# Patient Record
Sex: MALE | URBAN - METROPOLITAN AREA
[De-identification: names, ages, dates, MRNs, and addresses within clinical notes are randomized per-mention and may not be internally consistent; named-entity substitution may affect disease eponyms.]

---

## 2024-03-11 ENCOUNTER — HOSPITAL ENCOUNTER (OUTPATIENT)
Facility: MEDICAL CENTER | Age: 59
End: 2024-03-11
Attending: PHYSICIAN ASSISTANT
Payer: COMMERCIAL

## 2024-03-11 PROCEDURE — 87186 SC STD MICRODIL/AGAR DIL: CPT

## 2024-03-11 PROCEDURE — 87077 CULTURE AEROBIC IDENTIFY: CPT

## 2024-03-11 PROCEDURE — 87086 URINE CULTURE/COLONY COUNT: CPT

## 2024-03-13 LAB
BACTERIA UR CULT: ABNORMAL
BACTERIA UR CULT: ABNORMAL
SIGNIFICANT IND 70042: ABNORMAL
SITE SITE: ABNORMAL
SOURCE SOURCE: ABNORMAL

## 2024-10-13 ENCOUNTER — OFFICE VISIT (OUTPATIENT)
Dept: URGENT CARE | Facility: CLINIC | Age: 59
End: 2024-10-13
Payer: COMMERCIAL

## 2024-10-13 VITALS
HEIGHT: 69 IN | DIASTOLIC BLOOD PRESSURE: 82 MMHG | RESPIRATION RATE: 18 BRPM | SYSTOLIC BLOOD PRESSURE: 124 MMHG | HEART RATE: 80 BPM | TEMPERATURE: 98.4 F | BODY MASS INDEX: 34.07 KG/M2 | OXYGEN SATURATION: 97 % | WEIGHT: 230 LBS

## 2024-10-13 DIAGNOSIS — H92.01 RIGHT EAR PAIN: ICD-10-CM

## 2024-10-13 PROCEDURE — 3079F DIAST BP 80-89 MM HG: CPT | Performed by: FAMILY MEDICINE

## 2024-10-13 PROCEDURE — 99203 OFFICE O/P NEW LOW 30 MIN: CPT | Performed by: FAMILY MEDICINE

## 2024-10-13 PROCEDURE — 3074F SYST BP LT 130 MM HG: CPT | Performed by: FAMILY MEDICINE

## 2024-10-13 RX ORDER — FLUTICASONE PROPIONATE 50 UG/1
SPRAY, METERED NASAL
COMMUNITY
Start: 2024-07-23

## 2024-10-13 RX ORDER — ALBUTEROL SULFATE 90 UG/1
AEROSOL, METERED RESPIRATORY (INHALATION)
COMMUNITY

## 2024-12-14 ENCOUNTER — HOSPITAL ENCOUNTER (EMERGENCY)
Facility: MEDICAL CENTER | Age: 59
End: 2024-12-14
Attending: STUDENT IN AN ORGANIZED HEALTH CARE EDUCATION/TRAINING PROGRAM
Payer: COMMERCIAL

## 2024-12-14 ENCOUNTER — OFFICE VISIT (OUTPATIENT)
Dept: URGENT CARE | Facility: CLINIC | Age: 59
End: 2024-12-14
Payer: COMMERCIAL

## 2024-12-14 VITALS
RESPIRATION RATE: 20 BRPM | DIASTOLIC BLOOD PRESSURE: 85 MMHG | OXYGEN SATURATION: 96 % | TEMPERATURE: 98 F | WEIGHT: 246.69 LBS | BODY MASS INDEX: 36.54 KG/M2 | HEIGHT: 69 IN | SYSTOLIC BLOOD PRESSURE: 172 MMHG | HEART RATE: 75 BPM

## 2024-12-14 VITALS
SYSTOLIC BLOOD PRESSURE: 142 MMHG | HEART RATE: 72 BPM | BODY MASS INDEX: 35.55 KG/M2 | DIASTOLIC BLOOD PRESSURE: 84 MMHG | WEIGHT: 240 LBS | HEIGHT: 69 IN | RESPIRATION RATE: 18 BRPM | TEMPERATURE: 97 F | OXYGEN SATURATION: 96 %

## 2024-12-14 DIAGNOSIS — R42 DIZZY: ICD-10-CM

## 2024-12-14 DIAGNOSIS — H53.8 BLURRED VISION: ICD-10-CM

## 2024-12-14 DIAGNOSIS — H53.9 CHANGE IN VISION: ICD-10-CM

## 2024-12-14 PROCEDURE — 3077F SYST BP >= 140 MM HG: CPT | Performed by: FAMILY MEDICINE

## 2024-12-14 PROCEDURE — 99214 OFFICE O/P EST MOD 30 MIN: CPT | Performed by: FAMILY MEDICINE

## 2024-12-14 PROCEDURE — 99284 EMERGENCY DEPT VISIT MOD MDM: CPT

## 2024-12-14 PROCEDURE — 3079F DIAST BP 80-89 MM HG: CPT | Performed by: FAMILY MEDICINE

## 2024-12-14 ASSESSMENT — ENCOUNTER SYMPTOMS: DIZZINESS: 1

## 2024-12-14 NOTE — PROGRESS NOTES
"Subjective     Luis Jain is a 59 y.o. male who presents with Other (Hard to focus, heavy-feeling inside head, ears feel full, \"feel off\" slightly blurred vision x this morning,  worsened as he got up, equilibrium feels off )    This is a  new problem with uncertain prognosis:   This is a very pleasant 59 y.o. who has come to the walk-in clinic today for episode yesterday when leaving the gym for several minutes where he felt a little weak and off balance and dizzy and maybe like he might pass out but he looked close to his house so went home and ate and then felt better.      This morning after doing usual things around the house started to feel funny again and describes this as a \"heavy\" and  \"not right\" feeling inside his head, vision is a little bit off and has to turn/position his head a certain way to see better and feels like the balance is a little off (he feels like this is because his vision is not right).      Denies any chest pain or shortness of breath.  No focal limb weakness numbness or heaviness.  No nausea vomiting fevers or chills.          ALLERGIES:  Penicillins     PMH:  History reviewed. No pertinent past medical history.     PSH:  History reviewed. No pertinent surgical history.    MEDS:  No current outpatient medications on file.    ** I have documented what I find to be significant in regards to past medical, social, family and surgical history  in my HPI or under PMH/PSH/FH review section, otherwise it is noncontributory **           HPI    Review of Systems   Eyes:         Vision change   Neurological:  Positive for dizziness.   All other systems reviewed and are negative.             Objective     BP (!) 142/84 (BP Location: Left arm, Patient Position: Sitting, BP Cuff Size: Adult)   Pulse 72   Temp 36.1 °C (97 °F) (Temporal)   Resp 18   Ht 1.753 m (5' 9\")   Wt 109 kg (240 lb)   SpO2 96%   BMI 35.44 kg/m²      R 20/30  L 20/50  B 20/25    Physical Exam  Vitals and nursing note " reviewed.   Constitutional:       General: He is not in acute distress.     Appearance: Normal appearance. He is well-developed. He is not ill-appearing, toxic-appearing or diaphoretic.   HENT:      Head: Normocephalic.      Right Ear: Tympanic membrane normal.      Left Ear: Tympanic membrane normal.      Mouth/Throat:      Mouth: Mucous membranes are moist.      Pharynx: Oropharynx is clear. No oropharyngeal exudate.   Eyes:      Extraocular Movements: Extraocular movements intact.   Cardiovascular:      Rate and Rhythm: Normal rate and regular rhythm.      Heart sounds: Normal heart sounds.   Pulmonary:      Effort: Pulmonary effort is normal. No respiratory distress.      Breath sounds: Normal breath sounds. No wheezing, rhonchi or rales.   Neurological:      General: No focal deficit present.      Mental Status: He is alert and oriented to person, place, and time.      Cranial Nerves: No cranial nerve deficit.      Motor: No weakness or abnormal muscle tone.      Coordination: Coordination normal.      Comments: No pronator drift.  Normal finger-to-nose.  Negative Romberg   Psychiatric:         Mood and Affect: Mood normal.         Behavior: Behavior normal.                             Assessment & Plan     1. Dizzy  POCT Urinalysis    EKG - Clinic Performed      2. Change in vision  EKG - Clinic Performed      EKG: NSR  73  , flat/neg t wave changes lateral leads    - Dx, plan & d/c instructions discussed   - asked patient to go to ER for evaluation      Patient left in stable condition     Discussed if any testing, labs or imaging studies are obtained outside of the [facility] facility, it is their responsibility to contact the Urgent Care and let us know that it was done and get us the results so adequate follow up can be initiated    Pertinent prior lab work and/or imaging studies in Epic have been reviewed by me today on day of this visit and taken into account for my treatment and plan today    Pertinent  PMH/PSH and/or chronic conditions and medications if any were reviewed today and taken into account for my treatment and plan today    Pertinent prior office visit notes in Epic have been reviewed by me today on day of this visit.    Please note that this dictation may have been created using voice recognition software, if so I have made every reasonable attempt to correct obvious errors, but I expect that there are errors of grammar and possibly content that I did not discover before finalizing the note.

## 2024-12-15 NOTE — ED NOTES
Visual acuity test administered with results of left eye 20/25, right eye 20/30, and both eyes 20/25.

## 2024-12-15 NOTE — ED PROVIDER NOTES
"ED Provider Note    CHIEF COMPLAINT  Chief Complaint   Patient presents with    Sent from Urgent Care    Blurred Vision     Since 0830 this am       EXTERNAL RECORDS REVIEWED  Outpatient Notes sent from urgent care today for vision changes and dizziness    HPI/ROS  LIMITATION TO HISTORY   Select: : None  OUTSIDE HISTORIAN(S):      Luis Jain is a 59 y.o. male who presents with mild blurred vision. Began this morning. Corrects with mild tilting of the head leftward. Reports he has been watching a lot of TV lately. Denies medical problems. Denies any associated dizziness, headache, chest pain    PAST MEDICAL HISTORY       SURGICAL HISTORY  patient denies any surgical history    FAMILY HISTORY  History reviewed. No pertinent family history.    SOCIAL HISTORY  Social History     Tobacco Use    Smoking status: Never    Smokeless tobacco: Never   Vaping Use    Vaping status: Never Used   Substance and Sexual Activity    Alcohol use: Yes     Comment: socially    Drug use: Never    Sexual activity: Not on file       CURRENT MEDICATIONS  Home Medications    **Home medications have not yet been reviewed for this encounter**         ALLERGIES  Allergies   Allergen Reactions    Penicillins Unspecified       PHYSICAL EXAM  VITAL SIGNS: BP (!) 172/85   Pulse 75   Temp 36.7 °C (98 °F) (Temporal)   Resp 20   Ht 1.753 m (5' 9\")   Wt 112 kg (246 lb 11.1 oz)   SpO2 96%   BMI 36.43 kg/m²    Physical Exam  Vitals and nursing note reviewed.   Constitutional:       Appearance: He is well-developed.   HENT:      Head: Normocephalic.   Eyes:      Extraocular Movements: Extraocular movements intact.      Pupils: Pupils are equal, round, and reactive to light.   Cardiovascular:      Rate and Rhythm: Normal rate and regular rhythm.      Heart sounds: No murmur heard.  Pulmonary:      Effort: Pulmonary effort is normal.      Breath sounds: Normal breath sounds.   Abdominal:      Palpations: Abdomen is soft.      Tenderness: There is no " abdominal tenderness.   Musculoskeletal:         General: Normal range of motion.      Right lower leg: No edema.      Left lower leg: No edema.   Skin:     General: Skin is warm.   Neurological:      General: No focal deficit present.      Mental Status: He is alert and oriented to person, place, and time.      Cranial Nerves: No cranial nerve deficit.      Sensory: No sensory deficit.      Motor: No weakness.      Coordination: Coordination normal.      Gait: Gait normal.          EKG/LABS  EKG from urgent care reviewed showing NSR  I have independently interpreted this EKG          COURSE & MEDICAL DECISION MAKING    ASSESSMENT, COURSE AND PLAN  Care Narrative: 58 yo M otherwise healthy presenting with mild blurred vision correcting with head tilt. Neuro exam nonfocal. No nystagmus or other abnormalities with extraocular movements. Visual acuities within normal limits. No appreciated corneal defects. No dizziness or other neurologic symptoms. Suspect fatigue of extraocular muscles with recent heavy screen time. Could be dry eye or other corneal issue. Highly doubt stroke in the abscence of risk factors, NSR EKG at urgent care, absence of other neurologic findings and benign exam. Therefore did not feel further imaging or lab workup was beneficial at this time. Discussed expedited ophtho eval as outpt and have placed referral and patient is comfortable with this plan. Discussed appropriate return precautions including those pertinent in the setting of stroke, vasculitis, autoimmune concerns              ADDITIONAL PROBLEMS MANAGED  History reviewed. No pertinent past medical history.      DISPOSITION AND DISCUSSIONS  I have discussed management of the patient with the following physicians and RAYMOND's:      Discussion of management with other [facility] or appropriate source(s): None     Escalation of care considered, and ultimately not performed:diagnostic imaging    Barriers to care at this time, including but not limited  to: .     Decision tools and prescription drugs considered including, but not limited to: .    FINAL DIAGNOSIS  1. Blurred vision         Electronically signed by: Oscar Joyner M.D., 12/14/2024 5:33 PM

## 2024-12-15 NOTE — ED TRIAGE NOTES
Pt to er from urgent care where he presented for blurred vision since 0830 this am. Denies pain, weakness, states told to come for further work up. Pt aao, in no distress, yet hypertensive in triage and denies med hx